# Patient Record
Sex: FEMALE | Race: BLACK OR AFRICAN AMERICAN | Employment: OTHER | URBAN - METROPOLITAN AREA
[De-identification: names, ages, dates, MRNs, and addresses within clinical notes are randomized per-mention and may not be internally consistent; named-entity substitution may affect disease eponyms.]

---

## 2017-01-04 ENCOUNTER — HOSPITAL ENCOUNTER (EMERGENCY)
Age: 67
Discharge: STILL ADMITTED OR ADMINISTRATIVE | End: 2017-01-05
Attending: EMERGENCY MEDICINE

## 2017-05-31 ENCOUNTER — HOSPITAL ENCOUNTER (OUTPATIENT)
Dept: LAB | Age: 67
Discharge: HOME | End: 2017-05-31

## 2017-05-31 PROCEDURE — 99001 SPECIMEN HANDLING PT-LAB: CPT | Performed by: INTERNAL MEDICINE

## 2017-09-13 ENCOUNTER — APPOINTMENT (OUTPATIENT)
Dept: GENERAL RADIOLOGY | Age: 67
DRG: 881 | End: 2017-09-13
Attending: EMERGENCY MEDICINE
Payer: MEDICARE

## 2017-09-13 ENCOUNTER — HOSPITAL ENCOUNTER (INPATIENT)
Age: 67
LOS: 1 days | Discharge: HOME | DRG: 881 | End: 2017-09-14
Attending: EMERGENCY MEDICINE | Admitting: PSYCHIATRY & NEUROLOGY
Payer: MEDICARE

## 2017-09-13 DIAGNOSIS — R45.851 SUICIDAL IDEATIONS: ICD-10-CM

## 2017-09-13 DIAGNOSIS — F33.2 SEVERE EPISODE OF RECURRENT MAJOR DEPRESSIVE DISORDER, WITHOUT PSYCHOTIC FEATURES (HCC): Primary | ICD-10-CM

## 2017-09-13 PROBLEM — F32.A DEPRESSION: Status: ACTIVE | Noted: 2017-09-13

## 2017-09-13 LAB
ALBUMIN SERPL-MCNC: 3.9 G/DL (ref 3.5–5)
ALBUMIN/GLOB SERPL: 1 {RATIO} (ref 1.1–2.2)
ALP SERPL-CCNC: 52 U/L (ref 45–117)
ALT SERPL-CCNC: 22 U/L (ref 12–78)
AMPHET UR QL SCN: NEGATIVE
ANION GAP SERPL CALC-SCNC: 9 MMOL/L (ref 5–15)
APPEARANCE UR: CLEAR
AST SERPL-CCNC: 12 U/L (ref 15–37)
BACTERIA URNS QL MICRO: NEGATIVE /HPF
BARBITURATES UR QL SCN: NEGATIVE
BASOPHILS # BLD: 0 K/UL (ref 0–0.1)
BASOPHILS NFR BLD: 0 % (ref 0–1)
BENZODIAZ UR QL: NEGATIVE
BILIRUB SERPL-MCNC: 0.4 MG/DL (ref 0.2–1)
BILIRUB UR QL: NEGATIVE
BUN SERPL-MCNC: 10 MG/DL (ref 6–20)
BUN/CREAT SERPL: 12 (ref 12–20)
CALCIUM SERPL-MCNC: 9.9 MG/DL (ref 8.5–10.1)
CANNABINOIDS UR QL SCN: NEGATIVE
CHLORIDE SERPL-SCNC: 104 MMOL/L (ref 97–108)
CK MB CFR SERPL CALC: NORMAL % (ref 0–2.5)
CK MB SERPL-MCNC: <1 NG/ML (ref 5–25)
CK SERPL-CCNC: 90 U/L (ref 26–192)
CO2 SERPL-SCNC: 28 MMOL/L (ref 21–32)
COCAINE UR QL SCN: NEGATIVE
COLOR UR: ABNORMAL
CREAT SERPL-MCNC: 0.81 MG/DL (ref 0.55–1.02)
DRUG SCRN COMMENT,DRGCM: NORMAL
EOSINOPHIL # BLD: 0.1 K/UL (ref 0–0.4)
EOSINOPHIL NFR BLD: 3 % (ref 0–7)
EPITH CASTS URNS QL MICRO: ABNORMAL /LPF
ERYTHROCYTE [DISTWIDTH] IN BLOOD BY AUTOMATED COUNT: 13.8 % (ref 11.5–14.5)
ETHANOL SERPL-MCNC: <10 MG/DL
GLOBULIN SER CALC-MCNC: 4.1 G/DL (ref 2–4)
GLUCOSE SERPL-MCNC: 156 MG/DL (ref 65–100)
GLUCOSE UR STRIP.AUTO-MCNC: NEGATIVE MG/DL
HCT VFR BLD AUTO: 40.4 % (ref 35–47)
HGB BLD-MCNC: 13.1 G/DL (ref 11.5–16)
HGB UR QL STRIP: NEGATIVE
KETONES UR QL STRIP.AUTO: NEGATIVE MG/DL
LEUKOCYTE ESTERASE UR QL STRIP.AUTO: NEGATIVE
LYMPHOCYTES # BLD: 1.9 K/UL (ref 0.8–3.5)
LYMPHOCYTES NFR BLD: 42 % (ref 12–49)
MCH RBC QN AUTO: 25.8 PG (ref 26–34)
MCHC RBC AUTO-ENTMCNC: 32.4 G/DL (ref 30–36.5)
MCV RBC AUTO: 79.7 FL (ref 80–99)
METHADONE UR QL: NEGATIVE
MONOCYTES # BLD: 0.5 K/UL (ref 0–1)
MONOCYTES NFR BLD: 11 % (ref 5–13)
NEUTS SEG # BLD: 2.1 K/UL (ref 1.8–8)
NEUTS SEG NFR BLD: 44 % (ref 32–75)
NITRITE UR QL STRIP.AUTO: NEGATIVE
OPIATES UR QL: NEGATIVE
PCP UR QL: NEGATIVE
PH UR STRIP: 5.5 [PH] (ref 5–8)
PLATELET # BLD AUTO: 191 K/UL (ref 150–400)
POTASSIUM SERPL-SCNC: 4.8 MMOL/L (ref 3.5–5.1)
PROT SERPL-MCNC: 8 G/DL (ref 6.4–8.2)
PROT UR STRIP-MCNC: ABNORMAL MG/DL
RBC # BLD AUTO: 5.07 M/UL (ref 3.8–5.2)
RBC #/AREA URNS HPF: ABNORMAL /HPF (ref 0–5)
SODIUM SERPL-SCNC: 141 MMOL/L (ref 136–145)
SP GR UR REFRACTOMETRY: 1.01 (ref 1–1.03)
UA: UC IF INDICATED,UAUC: ABNORMAL
UROBILINOGEN UR QL STRIP.AUTO: 0.2 EU/DL (ref 0.2–1)
WBC # BLD AUTO: 4.6 K/UL (ref 3.6–11)
WBC URNS QL MICRO: ABNORMAL /HPF (ref 0–4)

## 2017-09-13 PROCEDURE — 85025 COMPLETE CBC W/AUTO DIFF WBC: CPT | Performed by: EMERGENCY MEDICINE

## 2017-09-13 PROCEDURE — 82553 CREATINE MB FRACTION: CPT | Performed by: EMERGENCY MEDICINE

## 2017-09-13 PROCEDURE — 65220000003 HC RM SEMIPRIVATE PSYCH

## 2017-09-13 PROCEDURE — 80307 DRUG TEST PRSMV CHEM ANLYZR: CPT | Performed by: EMERGENCY MEDICINE

## 2017-09-13 PROCEDURE — 80053 COMPREHEN METABOLIC PANEL: CPT | Performed by: EMERGENCY MEDICINE

## 2017-09-13 PROCEDURE — 74011250637 HC RX REV CODE- 250/637: Performed by: INTERNAL MEDICINE

## 2017-09-13 PROCEDURE — 74011250637 HC RX REV CODE- 250/637: Performed by: EMERGENCY MEDICINE

## 2017-09-13 PROCEDURE — 90791 PSYCH DIAGNOSTIC EVALUATION: CPT

## 2017-09-13 PROCEDURE — 93005 ELECTROCARDIOGRAM TRACING: CPT

## 2017-09-13 PROCEDURE — 99285 EMERGENCY DEPT VISIT HI MDM: CPT

## 2017-09-13 PROCEDURE — 36415 COLL VENOUS BLD VENIPUNCTURE: CPT | Performed by: EMERGENCY MEDICINE

## 2017-09-13 PROCEDURE — 81001 URINALYSIS AUTO W/SCOPE: CPT | Performed by: EMERGENCY MEDICINE

## 2017-09-13 PROCEDURE — 82550 ASSAY OF CK (CPK): CPT | Performed by: EMERGENCY MEDICINE

## 2017-09-13 RX ORDER — ADHESIVE BANDAGE
30 BANDAGE TOPICAL DAILY PRN
Status: DISCONTINUED | OUTPATIENT
Start: 2017-09-13 | End: 2017-09-14 | Stop reason: HOSPADM

## 2017-09-13 RX ORDER — CLONIDINE HYDROCHLORIDE 0.1 MG/1
0.1 TABLET ORAL
Status: COMPLETED | OUTPATIENT
Start: 2017-09-13 | End: 2017-09-13

## 2017-09-13 RX ORDER — BENZTROPINE MESYLATE 1 MG/ML
1 INJECTION INTRAMUSCULAR; INTRAVENOUS
Status: DISCONTINUED | OUTPATIENT
Start: 2017-09-13 | End: 2017-09-14 | Stop reason: HOSPADM

## 2017-09-13 RX ORDER — BENZTROPINE MESYLATE 1 MG/1
1 TABLET ORAL
Status: DISCONTINUED | OUTPATIENT
Start: 2017-09-13 | End: 2017-09-14 | Stop reason: HOSPADM

## 2017-09-13 RX ORDER — CLONIDINE HYDROCHLORIDE 0.1 MG/1
0.2 TABLET ORAL
Status: COMPLETED | OUTPATIENT
Start: 2017-09-13 | End: 2017-09-13

## 2017-09-13 RX ORDER — OLANZAPINE 2.5 MG/1
2.5 TABLET ORAL
Status: DISCONTINUED | OUTPATIENT
Start: 2017-09-13 | End: 2017-09-14 | Stop reason: HOSPADM

## 2017-09-13 RX ORDER — METFORMIN HYDROCHLORIDE 1000 MG/1
1000 TABLET ORAL 2 TIMES DAILY WITH MEALS
Status: ON HOLD | COMMUNITY
End: 2017-09-14

## 2017-09-13 RX ORDER — CLONIDINE HYDROCHLORIDE 0.1 MG/1
0.2 TABLET ORAL
Status: DISCONTINUED | OUTPATIENT
Start: 2017-09-13 | End: 2017-09-14 | Stop reason: HOSPADM

## 2017-09-13 RX ORDER — IBUPROFEN 400 MG/1
400 TABLET ORAL
Status: DISCONTINUED | OUTPATIENT
Start: 2017-09-13 | End: 2017-09-14 | Stop reason: HOSPADM

## 2017-09-13 RX ORDER — LOSARTAN POTASSIUM 50 MG/1
50 TABLET ORAL DAILY
COMMUNITY

## 2017-09-13 RX ORDER — LOSARTAN POTASSIUM 25 MG/1
50 TABLET ORAL DAILY
Status: DISCONTINUED | OUTPATIENT
Start: 2017-09-14 | End: 2017-09-13

## 2017-09-13 RX ORDER — METFORMIN HYDROCHLORIDE 500 MG/1
1000 TABLET ORAL 2 TIMES DAILY WITH MEALS
Status: DISCONTINUED | OUTPATIENT
Start: 2017-09-14 | End: 2017-09-14 | Stop reason: HOSPADM

## 2017-09-13 RX ORDER — ACETAMINOPHEN 325 MG/1
650 TABLET ORAL
Status: DISCONTINUED | OUTPATIENT
Start: 2017-09-13 | End: 2017-09-14 | Stop reason: HOSPADM

## 2017-09-13 RX ORDER — ZOLPIDEM TARTRATE 5 MG/1
5 TABLET ORAL
Status: DISCONTINUED | OUTPATIENT
Start: 2017-09-13 | End: 2017-09-14 | Stop reason: HOSPADM

## 2017-09-13 RX ORDER — VALSARTAN 80 MG/1
80 TABLET ORAL DAILY
Status: DISCONTINUED | OUTPATIENT
Start: 2017-09-14 | End: 2017-09-14 | Stop reason: HOSPADM

## 2017-09-13 RX ORDER — IBUPROFEN 200 MG
1 TABLET ORAL
Status: DISCONTINUED | OUTPATIENT
Start: 2017-09-13 | End: 2017-09-14 | Stop reason: HOSPADM

## 2017-09-13 RX ADMIN — CLONIDINE HYDROCHLORIDE 0.1 MG: 0.1 TABLET ORAL at 15:21

## 2017-09-13 RX ADMIN — CLONIDINE HYDROCHLORIDE 0.2 MG: 0.1 TABLET ORAL at 19:20

## 2017-09-13 NOTE — IP AVS SNAPSHOT
[facility] 
 
 
 [address] Patient: Akhil Estevez MRN: [id number] QQC:4/85/2701 You are allergic to the following No active allergies Recent Documentation Height Weight Breastfeeding? BMI OB Status Smoking Status 1.727 m 97.1 kg No 32.54 kg/m2 Postmenopausal Never Smoker About your hospitalization You were admitted on:  September 13, 2017 You last received care in the:  [facility]. 291 You were discharged on:  September 14, 2017 Unit phone number:  [phone] Why you were hospitalized Your primary diagnosis was:  Not on File Your diagnoses also included:  Depression Providers Seen During Your Hospitalizations Provider Role Specialty Primary office phone Devika Novak MD Attending Provider Emergency Medicine [phone] Sara Dietz MD Attending Provider Emergency Medicine [phone] Audi Chen MD Attending Provider Psychiatry [phone] Birdie Aj MD Attending Provider Psychiatry [phone] Your Primary Care Physician (PCP) Primary Care Physician Office Phone Office Fax [place], [address] [place] [address] [phone] Follow-up Information Follow up With Details Comments Contact Info Full [place]  Schedule an appointment as soon as possible for a visit Please call and ask for Aby to schedule intake visit. The [place] bereavement manual was given to you and you can find individual counselors in this manual.    [phone] 
 
[address] Go in 1 day Rapid access appointment 8:00AM -11:00AM & 12:00PM-3:00PM for mental health services. [facility] [address] [place] [address] 
[phone] Fax: [phone] Current Discharge Medication List  
  
ASK your doctor about these medications Dose & Instructions Dispensing Information Comments Morning Noon Evening Bedtime  
 bimatoprost 0.01 % ophthalmic drops Commonly known as:  LUMIGAN Your last dose was: Your next dose is:    
   
   
 Dose:  1 Drop Administer 1 Drop to both eyes every evening. Refills:  0  
     
   
   
   
  
 losartan 50 mg tablet Commonly known as:  COZAAR Your last dose was: Your next dose is:    
   
   
 Dose:  50 mg Take 50 mg by mouth daily. Refills:  0  
     
   
   
   
  
 lovastatin 40 mg tablet Commonly known as:  MEVACOR Your last dose was: Your next dose is:    
   
   
 Dose:  40 mg Take 40 mg by mouth nightly. Indications: hyperlipidemia Refills:  0  
     
   
   
   
  
 metFORMIN ER 1,000 mg Tr24 Your last dose was: Your next dose is:    
   
   
 Dose:  1 Tab Take 1 Tab by mouth two (2) times a day. Indications: type 2 diabetes mellitus Refills:  0 Discharge Instructions Hugo Perez [facility]  [phone] [facility] [place] [address] [phone] [place] [address]  [phone] [employer] 44-   [phone] [employer]-  [phone] 9 [facility]  [phone] [facility]7 [facility]  [phone] Discharge Orders None Introducing [facility]! Philippe Fishman introduces [employer] patient portal. Now you can access parts of your medical record, email your doctor's office, and request medication refills online. 1. In your internet browser, go to [email or web address]. [email or web address] 2. Click on the First Time User? Click Here link in the Sign In box. You will see the New Member Sign Up page. 3. Enter your [employer] Access Code exactly as it appears below. You will not need to use this code after youve completed the sign-up process. If you do not sign up before the expiration date, you must request a new code. · [employer] Access Code: [facility] Expires: 12/13/2017  4:01 PM 
 
4. Enter the last four digits of your Social Security Number (xxxx) and Date of Birth (mm/dd/yyyy) as indicated and click Submit. You will be taken to the next sign-up page. 5. Create a [employer] ID. This will be your [employer] login ID and cannot be changed, so think of one that is secure and easy to remember. 6. Create a [employer] password. You can change your password at any time. 7. Enter your Password Reset Question and Answer. This can be used at a later time if you forget your password. 8. Enter your e-mail address. You will receive e-mail notification when new information is available in [address] 9. Click Sign Up. You can now view and download portions of your medical record. 10. Click the Download Summary menu link to download a portable copy of your medical information. If you have questions, please visit the Frequently Asked Questions section of the [employer] website. Remember, [employer] is NOT to be used for urgent needs. For medical emergencies, dial 911. Now available from your iPhone and Android! General Information Please provide this summary of care documentation to your next provider. Patient Signature:  ____________________________________________________________ Date:  ____________________________________________________________  
  
[facility] Provider Signature:  ____________________________________________________________ Date:  ____________________________________________________________

## 2017-09-13 NOTE — BSMART NOTE
Comprehensive Assessment Form Part 1      Section I - Disposition    Axis I - Major Depression, single episode, severe   Axis II - Deferred  Axis III - No past medical history on file. Diabetes, hypertension  Axis IV - death of her , limtied support  [place] V - 28      The Medical Doctor to Psychiatrist conference was not completed. The Medical Doctor is in agreement with Psychiatrist disposition because of (reason) Admit recommended. The plan is admit to [facility] . Patient is not in agreement with admission and [facility]) called to evaluate. The on-call Psychiatrist consulted was Dr. Dewayne Romo. The admitting Psychiatrist will be Dr. Dewayne Romo. The admitting Diagnosis is Depression. The Payor source is Medicare. Section II - Integrated Summary  Summary:  Patient came in accompanied by her PCP, Dr Tracy Mc for depression and suicidal ideation with a plan to shoot herself with a gun at her bedside at home. Patient stated \"I feel like I'm in the way. \"  Patient lost her  last year suddenly to cancer. Patient reported depression, isolative behavior, no desire to do things she used to , poor appetite, and hypersomnia. Patient denied hallucinations and HI. Patient is alert, oriented, pleasant, and cooperative. Patient is concerned about leaving her house unattended while admitted. Patient will not agree to voluntary admission and wants outpatient treatment. Explained that it takes weeks, even months to get in and patient is at high risk based on age, stressor, living alone, and limited support. She has no prior mental health treatment history. Today she opened up to her PCP who physically brought her here. The patienthas demonstrated mental capacity to provide informed consent. The information is given by the patient. The Chief Complaint is SI with plan. The Precipitant Factors are loss of . Previous Hospitalizations: NA  The patient has not previously been in restraints.   Current Psychiatrist and/or  is NA. Lethality Assessment:    The potential for suicide noted by the following: defined plan, ideation and means . The potential for homicide is not noted. The patient has not been a perpetrator of sexual or physical abuse. There are not pending charges. The patient is felt to be at risk for self harm or harm to others. The attending nurse was advised that security has not been notified. Section III - Psychosocial  The patient's overall mood and attitude is depressed. Feelings of helplessness and hopelessness are observed by verbal statements. Generalized anxiety is not observed. Panic is not observed. Phobias are not observed. Obsessive compulsive tendencies are not observed. Section IV - Mental Status Exam  The patient's appearance shows no evidence of impairment. The patient's behavior shows no evidence of impairment. The patient is oriented to time, place, person and situation. The patient's speech shows no evidence of impairment. The patient's mood is depressed. The range of affect is flat. The patient's thought content demonstrates no evidence of impairment. The thought process shows no evidence of impairment. The patient's perception shows no evidence of impairment. The patient's memory shows no evidence of impairment. The patient's appetite is decreased . The patient's sleep has evidence of hypersomnia. The patient shows little insight. The patient's judgement is psychologically impaired. Section V - Substance Abuse  The patient is not using substances. Section VI - Living Arrangements  The patient is . The patient lives alone. The patient has no children. The patient does plan to return home upon discharge. The patient does not have legal issues pending. The patient's source of income comes from social security. Spiritism and cultural practices have not been voiced at this time.     The patient's greatest support comes from none and this person will not be involved with the treatment. The patient has not been in an event described as horrible or outside the realm of ordinary life experience either currently or in the past.  The patient has not been a victim of sexual/physical abuse. Section VII - Other Areas of Clinical Concern  The highest grade achieved is NA with the overall quality of school experience being described as NA. The patient is currently unemployed and speaks [employer] as a primary language. The patient has no communication impairments affecting communication. The patient's preference for learning can be described as: can read and write adequately. The patient's hearing is normal.  The patient's vision is impaired and  wears glasses or contacts.       [facility], [facility]

## 2017-09-13 NOTE — H&P
History and Physical    Subjective:     Reina Moore is a 75 y.o. female with past medical hx significnat for HTN. Pt is hospitalized at [facility] with principal diagnoisis of Depression. She claims to have accidentally told her PCP she was suicidal but really has no suicidal ideation. She is admitted voluntarily to [facility]. Pt's BP is noted to be high. Past Medical History:   Diagnosis Date    HTN (hypertension)     >DM    PSHx: none declared by pt. FHx: DM, HTN     Social History   Substance Use Topics    Smoking status: Never Smoker    Smokeless tobacco: Never Used    Alcohol use 0.6 oz/week     1 Cans of beer per week       Prior to Admission medications    Not on File     No Known Allergies     Review of Systems:  Constitutional: negative  Eyes: negative  Ears, Nose, Mouth, Throat, and Face: negative  Respiratory: negative  Cardiovascular: negative  Gastrointestinal: negative  Genitourinary:negative  Integument/Breast: negative  Hematologic/Lymphatic: negative  Musculoskeletal:negative  Neurological: negative  Behavioral/Psychiatric: negative  Endocrine: negative  Allergic/Immunologic: negative     Objective: Intake and Output:            Physical Exam:   Visit Vitals    BP (!) 219/100    Pulse 80    Temp 98 °F (36.7 °C)    Resp 16    Ht 5' 8\" (1.727 m)    Wt 99.8 kg (220 lb)    SpO2 100%    BMI 33.45 kg/m2     General:  Alert, cooperative, no distress, appears stated age. Head:  Normocephalic, without obvious abnormality, atraumatic. Eyes:  Conjunctivae/corneas clear. PERRL, EOMs intact. Ears:  Normal external ear canals both ears. Nose: Nares normal. Septum midline. Mucosa normal. No drainage or sinus tenderness. Throat: Lips, mucosa, and tongue normal. Teeth and gums normal.   Neck: Supple, symmetrical, trachea midline, no adenopathy, thyroid: no enlargement/tenderness/nodules, no carotid bruit and no JVD. Back:   Symmetric, no curvature. ROM normal. No CVA tenderness.    Lungs: Clear to auscultation bilaterally. Chest wall:  No tenderness or deformity. Heart:  Regular rate and rhythm, S1, S2 normal, no murmur, click, rub or gallop. Abdomen:   Soft, non-tender. Bowel sounds normal. No masses,  No organomegaly. Extremities: Extremities normal, atraumatic, no cyanosis or edema. Pulses: 2+ and symmetric all extremities. Skin: Skin color, texture, turgor normal. No rashes or lesions   Lymph nodes: Cervical, supraclavicular, and axillary nodes normal.   Neurologic: CNII-XII intact. Normal strength, sensation and reflexes throughout. Data Review:   Recent Results (from the past 24 hour(s))   EKG, 12 LEAD, INITIAL    Collection Time: 09/13/17  2:46 PM   Result Value Ref Range    Ventricular Rate 80 BPM    Atrial Rate 80 BPM    P-R Interval 180 ms    QRS Duration 86 ms    Q-T Interval 388 ms    QTC Calculation (Bezet) 447 ms    Calculated P Axis 58 degrees    Calculated R Axis -20 degrees    Calculated T Axis 69 degrees    Diagnosis       Normal sinus rhythm  Possible Left atrial enlargement  Left ventricular hypertrophy  Cannot rule out Septal infarct , age undetermined  Abnormal ECG  No previous ECGs available     ETHYL ALCOHOL    Collection Time: 09/13/17  2:57 PM   Result Value Ref Range    ALCOHOL(ETHYL),SERUM <10 <10 MG/DL   CBC WITH AUTOMATED DIFF    Collection Time: 09/13/17  2:57 PM   Result Value Ref Range    WBC 4.6 3.6 - 11.0 K/uL    RBC 5.07 3.80 - 5.20 M/uL    HGB 13.1 11.5 - 16.0 g/dL    HCT 40.4 35.0 - 47.0 %    MCV 79.7 (L) 80.0 - 99.0 FL    MCH 25.8 (L) 26.0 - 34.0 PG    MCHC 32.4 30.0 - 36.5 g/dL    RDW 13.8 11.5 - 14.5 %    PLATELET 680 506 - 221 K/uL    NEUTROPHILS 44 32 - 75 %    LYMPHOCYTES 42 12 - 49 %    MONOCYTES 11 5 - 13 %    EOSINOPHILS 3 0 - 7 %    BASOPHILS 0 0 - 1 %    ABS. NEUTROPHILS 2.1 1.8 - 8.0 K/UL    ABS. LYMPHOCYTES 1.9 0.8 - 3.5 K/UL    ABS. MONOCYTES 0.5 0.0 - 1.0 K/UL    ABS. EOSINOPHILS 0.1 0.0 - 0.4 K/UL    ABS.  BASOPHILS 0.0 0.0 - 0.1 K/UL   METABOLIC PANEL, COMPREHENSIVE    Collection Time: 09/13/17  2:57 PM   Result Value Ref Range    Sodium 141 136 - 145 mmol/L    Potassium 4.8 3.5 - 5.1 mmol/L    Chloride 104 97 - 108 mmol/L    CO2 28 21 - 32 mmol/L    Anion gap 9 5 - 15 mmol/L    Glucose 156 (H) 65 - 100 mg/dL    BUN 10 6 - 20 MG/DL    Creatinine 0.81 0.55 - 1.02 MG/DL    BUN/Creatinine ratio 12 12 - 20      GFR est AA >60 >60 ml/min/1.73m2    GFR est non-AA >60 >60 ml/min/1.73m2    Calcium 9.9 8.5 - 10.1 MG/DL    Bilirubin, total 0.4 0.2 - 1.0 MG/DL    ALT (SGPT) 22 12 - 78 U/L    AST (SGOT) 12 (L) 15 - 37 U/L    Alk. phosphatase 52 45 - 117 U/L    Protein, total 8.0 6.4 - 8.2 g/dL    Albumin 3.9 3.5 - 5.0 g/dL    Globulin 4.1 (H) 2.0 - 4.0 g/dL    A-G Ratio 1.0 (L) 1.1 - 2.2     CK-MB,QUANT.     Collection Time: 09/13/17  2:57 PM   Result Value Ref Range    CK - MB <1.0 <3.6 NG/ML    CK-MB Index Cannot be calculated 0.0 - 2.5     CK    Collection Time: 09/13/17  2:57 PM   Result Value Ref Range    CK 90 26 - 192 U/L   DRUG SCREEN, URINE    Collection Time: 09/13/17  2:58 PM   Result Value Ref Range    AMPHETAMINES NEGATIVE  NEG      BARBITURATES NEGATIVE  NEG      BENZODIAZEPINE NEGATIVE  NEG      COCAINE NEGATIVE  NEG      METHADONE NEGATIVE  NEG      OPIATES NEGATIVE  NEG      PCP(PHENCYCLIDINE) NEGATIVE  NEG      THC (TH-CANNABINOL) NEGATIVE  NEG      Drug screen comment (NOTE)    URINALYSIS W/ REFLEX CULTURE    Collection Time: 09/13/17  2:58 PM   Result Value Ref Range    Color YELLOW/STRAW      Appearance CLEAR CLEAR      Specific gravity 1.015 1.003 - 1.030      pH (UA) 5.5 5.0 - 8.0      Protein TRACE (A) NEG mg/dL    Glucose NEGATIVE  NEG mg/dL    Ketone NEGATIVE  NEG mg/dL    Bilirubin NEGATIVE  NEG      Blood NEGATIVE  NEG      Urobilinogen 0.2 0.2 - 1.0 EU/dL    Nitrites NEGATIVE  NEG      Leukocyte Esterase NEGATIVE  NEG      WBC 0-4 0 - 4 /hpf    RBC 0-5 0 - 5 /hpf    Epithelial cells FEW FEW /lpf    Bacteria NEGATIVE  NEG /hpf    UA:UC IF INDICATED CULTURE NOT INDICATED BY UA RESULT CNI         Assessment:     Active Problems:    Depression (9/13/2017)    DM    HTN    Plan:     Clonidine prn for HTN    Restart antihypertensive Losartan    Restart Metformin    No VTE prophylaxis indicated or necessary at this time.    Signed By: Clara Vaughn MD     September 13, 2017

## 2017-09-13 NOTE — IP AVS SNAPSHOT
Summary of Care Report The Summary of Care report has been created to help improve care coordination. Users with access to [employer] or [address] (Web-based application) may access additional patient information including the Discharge Summary. If you are not currently a [address] user and need more information, please call the number listed below in the [place] section and ask to be connected with Medical Records. Facility Information Name Address Phone [facility] [address] [place] [phone] Patient Information Patient Name Sex  Dari Dodd ([id number]) Female 1950 Discharge Information Admitting Provider Service Area Unit Pilar Nunez MD / [address] / [phone] Discharge Provider Discharge Date/Time Discharge Disposition Destination Emily Obrien MD / [phone] 17 1622 [facility] (none) Patient Language Language ENGLISH [13] Hospital Problems as of 2017  Never Reviewed Class Noted - Resolved Last Modified POA Active Problems Depression  2017 - Present 2017 by Pilar Nunez MD Unknown Entered by Pilra Nunez MD  
  
You are allergic to the following No active allergies Current Discharge Medication List  
  
ASK your doctor about these medications Dose & Instructions Dispensing Information Comments  
 bimatoprost 0.01 % ophthalmic drops Commonly known as:  LUMIGAN Dose:  1 Drop Administer 1 Drop to both eyes every evening. Refills:  0  
   
 losartan 50 mg tablet Commonly known as:  COZAAR Dose:  50 mg Take 50 mg by mouth daily. Refills:  0  
   
 lovastatin 40 mg tablet Commonly known as:  MEVACOR  Dose:  40 mg  
 Take 40 mg by mouth nightly. Indications: hyperlipidemia Refills:  0  
   
 metFORMIN ER 1,000 mg Tr24 Dose:  1 Tab Take 1 Tab by mouth two (2) times a day. Indications: type 2 diabetes mellitus Refills:  0 Follow-up Information Follow up With Details Comments Contact Info Full [place]  Schedule an appointment as soon as possible for a visit Please call and ask for Aby to schedule intake visit. The [place] bereavement manual was given to you and you can find individual counselors in this manual.    [phone] 
 
[place] [address] [address] [facility], [address] [facility] Go in 1 day Rapid access appointment 8:00AM -11:00AM & 12:00PM-3:00PM for mental health services. [employer] [address] [place] [address] 
[phone] Fax: [phone] Discharge Instructions [facility]  [phone] [address] [place] [address] [phone] [place] [address]  [phone] [employer] 00-   [phone] [employer]-  [phone] 8 [facility]  [phone] [address] [place] [address]  [phone] Chart Review Routing History Recipient Method Report Sent By Matt Nicholas MD  
Fax: [phone] Phone: [phone] Fax [facility] MD NOTES AUTO ROUTING REPORT Magdalena Felipe MD [[id number]] 9/14/2017  8:57 AM 09/14/2017

## 2017-09-13 NOTE — IP AVS SNAPSHOT
[facility] 
 
 
 [address] Patient: Bernita Spurling MRN: [id number] STY:[birth year] Current Discharge Medication List  
  
ASK your doctor about these medications Dose & Instructions Dispensing Information Comments Morning Noon Evening Bedtime  
 bimatoprost 0.01 % ophthalmic drops Commonly known as:  LUMIGAN Your last dose was: Your next dose is:    
   
   
 Dose:  1 Drop Administer 1 Drop to both eyes every evening. Refills:  0  
     
   
   
   
  
 losartan 50 mg tablet Commonly known as:  COZAAR Your last dose was: Your next dose is:    
   
   
 Dose:  50 mg Take 50 mg by mouth daily. Refills:  0  
     
   
   
   
  
 lovastatin 40 mg tablet Commonly known as:  MEVACOR Your last dose was: Your next dose is:    
   
   
 Dose:  40 mg Take 40 mg by mouth nightly. Indications: hyperlipidemia Refills:  0  
     
   
   
   
  
 metFORMIN ER 1,000 mg Tr24 Your last dose was: Your next dose is:    
   
   
 Dose:  1 Tab Take 1 Tab by mouth two (2) times a day. Indications: type 2 diabetes mellitus Refills:  0

## 2017-09-13 NOTE — ED PROVIDER NOTES
Patient is a 75 y.o. female presenting with mental health disorder. The history is provided by the patient and a caregiver. No  was used. Mental Health Problem    This is a new problem. The current episode started more than 1 week ago. The problem has been gradually worsening. Associated symptoms include self-injury. Mental status baseline is normal.  Her past medical history is significant for diabetes, hypertension and depression. Her past medical history does not include psychotropic medication treatment. Pt accompanied by her PMD Dr Paulina Selby from her office with concern for suicidality. Pt lost her  1 year ago and has had despondency since. She admitted to Dr Paulina Selby today that she had been having thoughts of suicide and had a gun in the house with plan of use. Pt has family in the area who are not readily involved in her life. She has been sleeping more, appetite less, not taking her DM/HTN medications regularly. Pt seen by [facility], unwilling to voluntarily be admitted. Pt seen by Crisis, now willing to be voluntarily admitted with the understanding that if she rescends this decision, she will be TDO'd. No past medical history on file. No past surgical history on file. No family history on file. Social History     Social History    Marital status:      Spouse name: N/A    Number of children: N/A    Years of education: N/A     Occupational History    Not on file. Social History Main Topics    Smoking status: Not on file    Smokeless tobacco: Not on file    Alcohol use Not on file    Drug use: Not on file    Sexual activity: Not on file     Other Topics Concern    Not on file     Social History Narrative         ALLERGIES: Review of patient's allergies indicates no known allergies. Review of Systems   Constitutional: Positive for appetite change and fatigue.    Psychiatric/Behavioral: Positive for dysphoric mood, self-injury, sleep disturbance and suicidal ideas. All other systems reviewed and are negative. Vitals:    09/13/17 1404   BP: (!) 211/103   Pulse: 76   Resp: 12   Temp: 97.5 °F (36.4 °C)   SpO2: 100%   Weight: 99.8 kg (220 lb)   Height: 5' 8\" (1.727 m)            Physical Exam   Constitutional: She is oriented to person, place, and time. She appears well-developed and well-nourished. No distress. Very pleasant older Black female with sad affect   HENT:   Head: Normocephalic and atraumatic. Right Ear: External ear normal.   Left Ear: External ear normal.   Nose: Nose normal.   Mouth/Throat: Oropharynx is clear and moist. No oropharyngeal exudate. Eyes: Conjunctivae and EOM are normal. Pupils are equal, round, and reactive to light. Right eye exhibits no discharge. Left eye exhibits no discharge. No scleral icterus. Neck: Normal range of motion. Neck supple. No thyromegaly present. Cardiovascular: Normal rate, regular rhythm and normal heart sounds. No murmur heard. Pulmonary/Chest: Effort normal and breath sounds normal. No respiratory distress. She has no wheezes. She has no rales. Abdominal: Soft. Musculoskeletal: Normal range of motion. She exhibits no edema or tenderness. Neurological: She is alert and oriented to person, place, and time. Skin: Skin is warm and dry. She is not diaphoretic. Psychiatric:   Depressed affect   Nursing note and vitals reviewed.        MDM  ED Course       Procedures

## 2017-09-13 NOTE — ED NOTES
Patient presents with depression worsening over the past year. Reports she has  Gun at home (that she purchased for personal protection) and has considered \"ending it all\" lately. Denies hx of inpatient admissions for SI/HI or mental health problems. Patient placed in gown. Advised patient of plan of care regarding care of mental health patient in ED. Patient calm, pleasant and appropriate at this time. Emergency Department Nursing Plan of Care       The Nursing Plan of Care is developed from the Nursing assessment and Emergency Department Attending provider initial evaluation. The plan of care may be reviewed in the ED Provider note.     The Plan of Care was developed with the following considerations:   Patient / Family readiness to learn indicated by:verbalized understanding  Persons(s) to be included in education: patient  Barriers to Learning/Limitations:Violeta Cervantes 80, RN    9/13/2017   3:03 PM

## 2017-09-13 NOTE — ED NOTES
TRANSFER - OUT REPORT:    Verbal report given to Ronit Martinez RN on Michelle Casas  being transferred to [facility]  for routine progression of care       Report consisted of patients Situation, Background, Assessment and   Recommendations(SBAR). Information from the following report(s) SBAR was reviewed with the receiving nurse. Lines:       Opportunity for questions and clarification was provided.       Patient transported with:  security

## 2017-09-14 VITALS
TEMPERATURE: 98.1 F | DIASTOLIC BLOOD PRESSURE: 92 MMHG | HEART RATE: 75 BPM | SYSTOLIC BLOOD PRESSURE: 197 MMHG | WEIGHT: 214 LBS | BODY MASS INDEX: 32.43 KG/M2 | RESPIRATION RATE: 18 BRPM | OXYGEN SATURATION: 100 % | HEIGHT: 68 IN

## 2017-09-14 PROCEDURE — 74011250637 HC RX REV CODE- 250/637: Performed by: INTERNAL MEDICINE

## 2017-09-14 RX ORDER — METFORMIN HYDROCHLORIDE 1000 MG/1
TABLET, FILM COATED, EXTENDED RELEASE ORAL
Status: ON HOLD | COMMUNITY
End: 2017-09-14

## 2017-09-14 RX ORDER — LOVASTATIN 40 MG/1
40 TABLET ORAL
COMMUNITY

## 2017-09-14 RX ORDER — METFORMIN HYDROCHLORIDE EXTENDED-RELEASE TABLETS 1000 MG/1
1 TABLET, FILM COATED, EXTENDED RELEASE ORAL 2 TIMES DAILY
COMMUNITY

## 2017-09-14 RX ADMIN — METFORMIN HYDROCHLORIDE 1000 MG: 500 TABLET, FILM COATED ORAL at 09:00

## 2017-09-14 RX ADMIN — VALSARTAN 80 MG: 80 TABLET ORAL at 09:00

## 2017-09-14 NOTE — BH NOTES
GROUP THERAPY PROGRESS NOTE    Luis Cortes is participating in Process Group. Group time: 30 minutes    Personal goal for participation: Identify Feelings; Explore feelings about past & present    Goal orientation: personal    Group therapy participation: active    Therapeutic interventions reviewed and discussed: Emotions Chart, Wish On A Star handout; Gratitude    Impression of participation: Ms. Concepcion Keen participated and shared appropriately during group. She shared her three wishes were: 1. To be discharged and go home; 2. Have a larger garden; 3. She wished she had completed college with a political science degree. Ms. oCncepcion Keen reported that she was grateful for learning to drive the last several years.

## 2017-09-14 NOTE — BH NOTES
Pt rested quietly in bed with eyes closed. No signs/symptoms of distress. Will monitor for changes. Q 15 minute checks continue. 2 hour chart audit done. Slept 4   hours since 2200. Pt discussed grief over husbands death, stated she did not want to kill herself but was trying to explain how badly she felt. Pt stated she had never gotten grief counseling and feels others have moved on and don't want to talk about her loss.

## 2017-09-14 NOTE — DISCHARGE INSTRUCTIONS
.Cheri Carrington [facility] 36 [phone]  [facility] [address] crisis- [phone]

## 2017-09-14 NOTE — BH NOTES
Patient has been visible on the unit,observed patient holding her head,staring in space,appears to be responding to internal stimuli,med nurse informed of patients complaint,to see if she can have some medicine for this problem. At the present time,patient has been decrease demanding. The patient will talkmwith staff to get her needs met. Remains on Q 15 minute checks for safety and she is complaint with med's and meals.

## 2017-09-14 NOTE — BH NOTES
Discharge Plans reviewed w/ Pt and Pt verbalized understanding. Pt received all valuables from security and purse from [facility] unit. Bob Mckeon has set up outpt arrangements for Pt and Pt has verbalized understanding of keeping these appts. Pt advised to f/u w/ PCP and all outpt providers. Pt was discharged with no s/s of distress. Pt walked out by Josue Marley.

## 2017-09-14 NOTE — BH NOTES
The patient Jessee Lenz is participating in Relaxation Group. Group time: 30 minutes    Personal goal for participation: personal  Goal orientation:  To learn to relax    Group therapy participation: Active    Therapeutic interventions reviewed and discussed: Yes    Peggy Gutierrez  9/13/2017

## 2017-09-14 NOTE — BH NOTES
GROUP THERAPY PROGRESS NOTE    Kristina Porter is participating in [facility].      Group time: 30 minutes    Personal goal for participation: daily orientation    Goal orientation: personal    Group therapy participation: active    Therapeutic interventions reviewed and discussed: yes    Impression of participation: cooperative

## 2017-09-14 NOTE — PROGRESS NOTES
GROUP THERAPY PROGRESS NOTE      Tony Campos was not present for medication group. However, she did show up at the end of group and I sat with her and went over the true/false medication quiz as well as the medication list hand-outs one-on-one. She was given a pillbox as well.        Zenaida Zaragoza, YURIDIAD, BCPS

## 2017-09-14 NOTE — BH NOTES
GROUP THERAPY PROGRESS NOTE    The patient Mirlande Jaramillo is participating in Reflection Group. Group time: 30 minutes    Personal goal for participation:  To reflect on today's occurences    Goal orientation: Reflection    Group therapy participation: Active    Therapeutic interventions reviewed and discussed: Yes    Mario Arias  9/13/2017

## 2017-09-14 NOTE — BH NOTES
This 75year old female admitted under the services Dr. Luis Gregorio with depression. Patient went to see PCP today and talking with her stated she would shoot herself with her gun, because she felt alone since her   3 years ago. Patient came to ED, admitted voluntary. Patient on admission, alert,oriented, tearful because she is denying suicidal ideation. Denies A/V hallucinations. Patient blood pressure 176/90 and 219/100 on admission. Dr. Sajan Lucas has seen , ordered clonidine 0.2mg po given. Patient oriented to unit, skin assessment completed by Sarmad Henry RN and Cristian Messina RN. Staff will offer support and continue safety checks q 15 minutes.

## 2017-09-14 NOTE — BH NOTES
Social Work     Pt is a 75year old  admitted voluntarily due to increased depression and suicidal ideations. Pt explained to treatment team that she has been feeling more depressed since the death of her  8 years ago and is interested in counseling and antidepressant medication. Pt verbalized interest in going home and did not wish to start medication in hospital.  Pt will be discharged today. Pt is alert and oriented. Pt denies SI/HI. Pt is free of delusions. Pt's attitude is cooperative; mood is euthymic with full range affect. Pt's thought process is logical and goal directed.  provided pt with a list of grief counselor that accept medicare and [employer].  provided pt with [facility] Bereavement Manual and highlighted widows groups in community. Pt was given suicide hotlines national and local support. Pt signed a safety contract and is willing to go visit her brother tomorrow for extra support. Pt is in agreement with the plan. [facility] care plan will be faxed electronically via [address]  [address] [place] [address]  [phone]  Fax: [phone]  Rapid access appointment 8:00AM -11:00AM & 12:00PM-3:00PM for mental health and substance abuse services. Full [facility]     [phone] [address] [facility]  Please call and ask for [facility] to schedule intake visit.   The [facility] bereavement manual was given to you and you can find individual counselors in this manual.

## 2017-09-15 NOTE — BH NOTES
INITIAL PSYCHIATRIC EVALUATION & DISCHARGE SUMMARY           IDENTIFICATION:    Patient Name  Karina Cifuentes   Date of Birth 1950   [facility] [id number]   Medical Record Number  [id number]      Age  75 y.o. PCP Shanice Cadena MD   Admit date:  9/13/2017    Room Number  320/02  @ [facility]   Date of Service  9/14/2017            HISTORY         TYPE OF DISCHARGE: REGULAR       CONDITION AT DISCHARGE: improved       REASON FOR HOSPITALIZATION:  CC: \"\". Pt admitted under a voluntary basis for depression with suicidal ideations . HISTORY OF PRESENT ILLNESS:    The patient, Karina Cifuentes, is a 75 y.o. BLACK [place]  female with a past psychiatric history significant for grief and depression , who presents at this time with complaints of (and/or evidence of) the following emotional symptoms: depression. Additional symptomatology include feeling depressed. The above symptoms have been present for years since her  passed away . These symptoms are of mild severity. These symptoms are constant in nature. The patient's condition has been precipitated by death of her  and psychosocial stressors (lives alone and her brother having stroke  ). She stated she was expressing her sadness to her PCP doctor and stated she has a gun and she wish she ends that but she did not  Do it and she has  no  intention about hurting herself and she stated she has issues with depression for while but her PCP never addressed that and she was trying to pull her attention so  She can get medications Patient's condition made worse remembering her  death . At time of discharge, Karina Cifuentes is without significant problems of depression. Patient free of suicidal and homicidal ideations (appears to be at very low risk of suicide or homicide) and reports many positive predictive factors in terms of not attempting suicide or homicide.  Overall presentation at time of discharge is most consistent with the diagnosis of adjustment disorder with depressed mood. Patient with request for discharge today. There are no grounds to seek a TDO. Patient has maximized benefit to be derived from acute inpatient psychiatric treatment. All members of the treatment team concur with each other in regards to plans for discharge today per patient's request.     ALLERGIES: No Known Allergies   MEDICATIONS PRIOR TO ADMISSION:   No prescriptions prior to admission. PAST MEDICAL HISTORY:   Past Medical History:   Diagnosis Date    Anxiety disorder     Depression     Diabetes ([address]     HTN (hypertension)    History reviewed. No pertinent surgical history. SOCIAL HISTORY: her  passed away eight years ago and she lives by herself    Social History     Social History    Marital status:      Spouse name: N/A    Number of children: N/A    Years of education: N/A     Occupational History    Not on file. Social History Main Topics    Smoking status: Never Smoker    Smokeless tobacco: Never Used    Alcohol use 0.6 oz/week     1 Cans of beer per week    Drug use: No    Sexual activity: No     Other Topics Concern    Not on file     Social History Narrative    No narrative on file      FAMILY HISTORY: History reviewed. No pertinent family history. History reviewed. No pertinent family history. REVIEW OF SYSTEMS:   History obtained from the patient  Pertinent items are noted in the History of Present Illness. All other Systems reviewed and are considered negative. MENTAL STATUS EXAM & VITALS     MENTAL STATUS EXAM (MSE):    MSE FINDINGS ARE WITHIN NORMAL LIMITS (WNL) UNLESS OTHERWISE STATED BELOW. ( ALL OF THE BELOW CATEGORIES OF THE MSE HAVE BEEN REVIEWED (reviewed 9/14/2017) AND UPDATED AS DEEMED APPROPRIATE )  General Presentation age appropriate, cooperative   Orientation oriented to time, place and person   Vital Signs  See below (reviewed 9/14/2017);  Vital Signs (BP, Pulse, & Temp) are within normal limits if not listed below. Gait and Station Stable/steady, no ataxia   Musculoskeletal System No extrapyramidal symptoms (EPS); no abnormal muscular movements or Tardive Dyskinesia (TD); muscle strength and tone are within normal limits   Language No aphasia or dysarthria   Speech:  normal pitch and normal volume   Thought Processes logical; normal rate of thoughts; fair abstract reasoning/computation   Thought Associations goal directed   Thought Content free of delusions   Suicidal Ideations no plan  and no intention   Homicidal Ideations no plan  and no intention   Mood:  depressed   Affect:  anxious   Memory recent  good   Memory remote:  intact   Concentration/Attention:  intact   Fund of Knowledge average   Insight:  fair   Reliability fair   Judgment:  fair          VITALS:     Patient Vitals for the past 24 hrs:   Temp Pulse Resp BP   09/14/17 0707 98.1 °F (36.7 °C) 75 18 (!) 197/92   09/13/17 2154 - - - 136/77     Wt Readings from Last 3 Encounters:   09/13/17 97.1 kg (214 lb)     Temp Readings from Last 3 Encounters:   09/14/17 98.1 °F (36.7 °C)     BP Readings from Last 3 Encounters:   09/14/17 (!) 197/92     Pulse Readings from Last 3 Encounters:   09/14/17 75            DATA     LABORATORY DATA:  Labs Reviewed   CBC WITH AUTOMATED DIFF - Abnormal; Notable for the following:        Result Value    MCV 79.7 (*)     MCH 25.8 (*)     All other components within normal limits   METABOLIC PANEL, COMPREHENSIVE - Abnormal; Notable for the following:     Glucose 156 (*)     AST (SGOT) 12 (*)     Globulin 4.1 (*)     A-G Ratio 1.0 (*)     All other components within normal limits   URINALYSIS W/ REFLEX CULTURE - Abnormal; Notable for the following:     Protein TRACE (*)     All other components within normal limits   ETHYL ALCOHOL   DRUG SCREEN, URINE   CK-MB,QUANT.    CK     Admission on 09/13/2017, Discharged on 09/14/2017   Component Date Value Ref Range Status    ALCOHOL(ETHYL),SERUM 09/13/2017 <10  <10 MG/DL Final    AMPHETAMINES 09/13/2017 NEGATIVE   NEG   Final    BARBITURATES 09/13/2017 NEGATIVE   NEG   Final    BENZODIAZEPINE 09/13/2017 NEGATIVE   NEG   Final    COCAINE 09/13/2017 NEGATIVE   NEG   Final    METHADONE 09/13/2017 NEGATIVE   NEG   Final    OPIATES 09/13/2017 NEGATIVE   NEG   Final    PCP(PHENCYCLIDINE) 09/13/2017 NEGATIVE   NEG   Final    THC (TH-CANNABINOL) 09/13/2017 NEGATIVE   NEG   Final    Drug screen comment 09/13/2017 (NOTE)   Final    Ventricular Rate 09/13/2017 80  BPM Preliminary    Atrial Rate 09/13/2017 80  BPM Preliminary    P-R Interval 09/13/2017 180  ms Preliminary    QRS Duration 09/13/2017 86  ms Preliminary    Q-T Interval 09/13/2017 388  ms Preliminary    QTC Calculation (Bezet) 09/13/2017 447  ms Preliminary    Calculated P Axis 09/13/2017 58  degrees Preliminary    Calculated R Axis 09/13/2017 -20  degrees Preliminary    Calculated T Axis 09/13/2017 69  degrees Preliminary    Diagnosis 09/13/2017    Preliminary                    Value:Normal sinus rhythm  Possible Left atrial enlargement  Left ventricular hypertrophy  Cannot rule out Septal infarct , age undetermined  Abnormal ECG  No previous ECGs available      WBC 09/13/2017 4.6  3.6 - 11.0 K/uL Final    RBC 09/13/2017 5.07  3.80 - 5.20 M/uL Final    HGB 09/13/2017 13.1  11.5 - 16.0 g/dL Final    HCT 09/13/2017 40.4  35.0 - 47.0 % Final    MCV 09/13/2017 79.7* 80.0 - 99.0 FL Final    MCH 09/13/2017 25.8* 26.0 - 34.0 PG Final    MCHC 09/13/2017 32.4  30.0 - 36.5 g/dL Final    RDW 09/13/2017 13.8  11.5 - 14.5 % Final    PLATELET 80/42/8860 930  150 - 400 K/uL Final    NEUTROPHILS 09/13/2017 44  32 - 75 % Final    LYMPHOCYTES 09/13/2017 42  12 - 49 % Final    MONOCYTES 09/13/2017 11  5 - 13 % Final    EOSINOPHILS 09/13/2017 3  0 - 7 % Final    BASOPHILS 09/13/2017 0  0 - 1 % Final    ABS. NEUTROPHILS 09/13/2017 2.1  1.8 - 8.0 K/UL Final    ABS. LYMPHOCYTES 09/13/2017 1.9  0.8 - 3.5 K/UL Final    ABS. MONOCYTES 09/13/2017 0.5  0.0 - 1.0 K/UL Final    ABS. EOSINOPHILS 09/13/2017 0.1  0.0 - 0.4 K/UL Final    ABS. BASOPHILS 09/13/2017 0.0  0.0 - 0.1 K/UL Final    Sodium 09/13/2017 141  136 - 145 mmol/L Final    Potassium 09/13/2017 4.8  3.5 - 5.1 mmol/L Final    Chloride 09/13/2017 104  97 - 108 mmol/L Final    CO2 09/13/2017 28  21 - 32 mmol/L Final    Anion gap 09/13/2017 9  5 - 15 mmol/L Final    Glucose 09/13/2017 156* 65 - 100 mg/dL Final    BUN 09/13/2017 10  6 - 20 MG/DL Final    Creatinine 09/13/2017 0.81  0.55 - 1.02 MG/DL Final    BUN/Creatinine ratio 09/13/2017 12  12 - 20   Final    GFR est AA 09/13/2017 >60  >60 ml/min/1.73m2 Final    GFR est non-AA 09/13/2017 >60  >60 ml/min/1.73m2 Final    Calcium 09/13/2017 9.9  8.5 - 10.1 MG/DL Final    Bilirubin, total 09/13/2017 0.4  0.2 - 1.0 MG/DL Final    ALT (SGPT) 09/13/2017 22  12 - 78 U/L Final    AST (SGOT) 09/13/2017 12* 15 - 37 U/L Final    Alk.  phosphatase 09/13/2017 52  45 - 117 U/L Final    Protein, total 09/13/2017 8.0  6.4 - 8.2 g/dL Final    Albumin 09/13/2017 3.9  3.5 - 5.0 g/dL Final    Globulin 09/13/2017 4.1* 2.0 - 4.0 g/dL Final    A-G Ratio 09/13/2017 1.0* 1.1 - 2.2   Final    Color 09/13/2017 YELLOW/STRAW    Final    Appearance 09/13/2017 CLEAR  CLEAR   Final    Specific gravity 09/13/2017 1.015  1.003 - 1.030   Final    pH (UA) 09/13/2017 5.5  5.0 - 8.0   Final    Protein 09/13/2017 TRACE* NEG mg/dL Final    Glucose 09/13/2017 NEGATIVE   NEG mg/dL Final    Ketone 09/13/2017 NEGATIVE   NEG mg/dL Final    Bilirubin 09/13/2017 NEGATIVE   NEG   Final    Blood 09/13/2017 NEGATIVE   NEG   Final    Urobilinogen 09/13/2017 0.2  0.2 - 1.0 EU/dL Final    Nitrites 09/13/2017 NEGATIVE   NEG   Final    Leukocyte Esterase 09/13/2017 NEGATIVE   NEG   Final    WBC 09/13/2017 0-4  0 - 4 /hpf Final    RBC 09/13/2017 0-5  0 - 5 /hpf Final    Epithelial cells 09/13/2017 FEW  FEW /lpf Final    Bacteria 09/13/2017 NEGATIVE   NEG /hpf Final    UA:UC IF INDICATED 09/13/2017 CULTURE NOT INDICATED BY UA RESULT  CNI   Final    CK - MB 09/13/2017 <1.0  <3.6 NG/ML Final    CK-MB Index 09/13/2017 Cannot be calculated  0.0 - 2.5   Final    CK 09/13/2017 90  26 - 192 U/L Final        RADIOLOGY REPORTS:  No results found for this or any previous visit. No results found. MEDICATIONS       ALL MEDICATIONS  No current facility-administered medications for this encounter. Current Outpatient Prescriptions   Medication Sig    metFORMIN ER 1,000 mg tr24 Take 1 Tab by mouth two (2) times a day. Indications: type 2 diabetes mellitus    lovastatin (MEVACOR) 40 mg tablet Take 40 mg by mouth nightly. Indications: hyperlipidemia    bimatoprost (LUMIGAN) 0.01 % ophthalmic drops Administer 1 Drop to both eyes every evening.  losartan (COZAAR) 50 mg tablet Take 50 mg by mouth daily. SCHEDULED MEDICATIONS  No current facility-administered medications for this encounter. ASSESSMENT & PLAN        The patient, Oneida Martin, is a 75 y.o.  female who presents at this time for treatment of the following diagnoses:  Patient Active Hospital Problem List:   Depression (9/13/2017)    Assessment: sadness and depressed     Plan: will be discharged to follow up as outpatient                PROVISIONAL & DISCHARGE DIAGNOSES:    Problem List  Never Reviewed          Codes Class    Depression ICD-10-CM: F32.9  ICD-9-CM: [address] [facility] 6 Problems    Depression        DISCHARGE DIAGNOSIS:   Axis I:  SEE ABOVE  Axis II: SEE ABOVE  Axis III: SEE ABOVE  Axis IV:  lack of structure  Axis V:  65 on admission, 65 on discharge 70(baseline)         A coordinated, multidisplinary treatment team (includes the nurse, unit pharmcist,  and writer) round was conducted for this initial evaluation with the patient present.      The following regarding medications was addressed during rounds with patient:   the risks and benefits of the proposed medication. The patient was given the opportunity to ask questions. Informed consent given to the use of the above medications. I will continue to adjust psychiatric and non-psychiatric medications (see above \"medication\" section and orders section for details) as deemed appropriate & based upon diagnoses and response to treatment. I have reviewed admission (and previous/old) labs and medical tests in the EHR and or transferring hospital documents. I will continue to order blood tests/labs and diagnostic tests as deemed appropriate and review results as they become available (see orders for details). I have reviewed old psychiatric and medical records available in the EHR. I Will order additional psychiatric records from other institutions to further elucidate the nature of patient's psychopathology and review once available. I will gather additional collateral information from friends, family and o/p treatment team to further elucidate the nature of patient's psychopathology and baselline level of psychiatric functioning. ESTIMATED LENGTH OF STAY:    1 day per patient's request.       STRENGTHS:  Access to housing/residential stability                 DISPOSITION:    Home. Patient to f/u with psychiatric/psychotherapy appointments. Pt to f/u with internist as directed. FOLLOW-UP CARE:    Activity as tolerated  Regular Diet  Wound Care: none needed  Follow-up Information     Follow up With Details Comments Contact Info    Full [place]  Schedule an appointment as soon as possible for a visit Please call and ask for [facility] to schedule intake visit.   The [facility] bereavement manual was given to you and you can find individual counselors in this manual.    [phone]    [facility], [address] [place] [address] in 1 day Rapid access appointment 8:00AM -11:00AM & 12: 00PM-3:00PM for mental health services. [address] [place] [address]  [phone]  Fax: [phone]                     PROGNOSIS:  Greatly dependent upon patient's ability to  to  f/u with psychiatric/psychotherapy appointments. DISCHARGE MEDICATIONS: (no changes made). Informed consent given for the use of following psychotropic medications. Cannot display discharge medications since this patient is not currently admitted.                                    SIGNED:    Allison Kuo MD  9/14/2017

## 2017-09-19 LAB
ATRIAL RATE: 80 BPM
CALCULATED P AXIS, ECG09: 58 DEGREES
CALCULATED R AXIS, ECG10: -20 DEGREES
CALCULATED T AXIS, ECG11: 69 DEGREES
DIAGNOSIS, 93000: NORMAL
P-R INTERVAL, ECG05: 180 MS
Q-T INTERVAL, ECG07: 388 MS
QRS DURATION, ECG06: 86 MS
QTC CALCULATION (BEZET), ECG08: 447 MS
VENTRICULAR RATE, ECG03: 80 BPM

## 2021-09-09 ENCOUNTER — TRANSCRIBE ORDER (OUTPATIENT)
Dept: SCHEDULING | Age: 71
End: 2021-09-09

## 2021-09-09 DIAGNOSIS — R25.2 LEG CRAMPS: Primary | ICD-10-CM

## 2022-03-19 PROBLEM — F32.A DEPRESSION: Status: ACTIVE | Noted: 2017-09-13

## 2025-05-25 ENCOUNTER — TRANSCRIBE ORDERS (OUTPATIENT)
Facility: HOSPITAL | Age: 75
End: 2025-05-25

## 2025-05-25 DIAGNOSIS — Z41.9 ENCOUNTER FOR PROCEDURE FOR PURPOSES OTHER THAN REMEDYING HEALTH STATE, UNSPECIFIED: Primary | ICD-10-CM
